# Patient Record
Sex: FEMALE | Race: WHITE | NOT HISPANIC OR LATINO | Employment: STUDENT | URBAN - METROPOLITAN AREA
[De-identification: names, ages, dates, MRNs, and addresses within clinical notes are randomized per-mention and may not be internally consistent; named-entity substitution may affect disease eponyms.]

---
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
## 2020-03-24 ENCOUNTER — HOSPITAL ENCOUNTER (OUTPATIENT)
Facility: CLINIC | Age: 20
Discharge: HOME | End: 2020-03-24
Attending: FAMILY MEDICINE
Payer: COMMERCIAL

## 2020-03-24 VITALS
DIASTOLIC BLOOD PRESSURE: 60 MMHG | SYSTOLIC BLOOD PRESSURE: 110 MMHG | WEIGHT: 103.6 LBS | TEMPERATURE: 98.3 F | OXYGEN SATURATION: 98 % | HEART RATE: 90 BPM

## 2020-03-24 DIAGNOSIS — K52.9 GASTROENTERITIS: Primary | ICD-10-CM

## 2020-03-24 LAB — PREGNANCY TEST URINE, POC: NEGATIVE

## 2020-03-24 PROCEDURE — S9088 SERVICES PROVIDED IN URGENT: HCPCS | Performed by: FAMILY MEDICINE

## 2020-03-24 PROCEDURE — 99202 OFFICE O/P NEW SF 15 MIN: CPT | Performed by: FAMILY MEDICINE

## 2020-03-24 PROCEDURE — 81025 URINE PREGNANCY TEST: CPT | Performed by: FAMILY MEDICINE

## 2020-03-24 RX ORDER — NORGESTIMATE AND ETHINYL ESTRADIOL 0.25-0.035
1 KIT ORAL
COMMUNITY
Start: 2020-03-15

## 2020-03-24 RX ORDER — LAMOTRIGINE 200 MG/1
200 TABLET ORAL
COMMUNITY
Start: 2020-03-01

## 2020-03-24 RX ORDER — ESCITALOPRAM OXALATE 20 MG/1
20 TABLET ORAL
COMMUNITY
Start: 2020-03-01

## 2020-03-24 ASSESSMENT — ENCOUNTER SYMPTOMS
SINUS PRESSURE: 0
SORE THROAT: 0
SINUS PAIN: 0
NEUROLOGICAL NEGATIVE: 1
NAUSEA: 1
CHILLS: 0
DIARRHEA: 1
PSYCHIATRIC NEGATIVE: 1
CARDIOVASCULAR NEGATIVE: 1
VOMITING: 1
FEVER: 0
ENDOCRINE NEGATIVE: 1
EYES NEGATIVE: 1
MUSCULOSKELETAL NEGATIVE: 1
RESPIRATORY NEGATIVE: 1

## 2020-03-24 NOTE — ED PROVIDER NOTES
History  Chief Complaint   Patient presents with   • Abdominal Pain   • Diarrhea   • Vomiting     She is here with the c/o diarrhea ,nausea and vomitings 4-5 times today ,diarrhea is watery ,but noticed some blood streaks on the wipe ,she thinks because of the frequent wiping ,no rectal bleeding   No sick contacts ,no recent travel  Has some abdominal cramping  No recent antibiotic use   No fever  No urine symptoms   She stated she is not pregnant ,got her LMP 1 week ago .on OC pills for contraception  Works in [employer] .    She ate food at [employer] yesterday           History reviewed. No pertinent past medical history.    History reviewed. No pertinent surgical history.    History reviewed. No pertinent family history.    Social History     Tobacco Use   • Smoking status: Never Smoker   • Smokeless tobacco: Never Used   Substance Use Topics   • Alcohol use: Not on file   • Drug use: Not on file       Review of Systems   Constitutional: Negative for chills and fever.   HENT: Negative for congestion, sinus pressure, sinus pain and sore throat.    Eyes: Negative.    Respiratory: Negative.    Cardiovascular: Negative.    Gastrointestinal: Positive for diarrhea, nausea and vomiting.   Endocrine: Negative.    Genitourinary: Negative.    Musculoskeletal: Negative.    Skin: Negative.    Neurological: Negative.    Psychiatric/Behavioral: Negative.        Physical Exam  ED Triage Vitals [03/24/20 1315]   Temp Heart Rate Resp BP SpO2   36.8 °C (98.3 °F) 90 -- 110/60 98 %      Temp src Heart Rate Source Patient Position BP Location FiO2 (%) (Set)   -- -- -- -- --       Physical Exam   Constitutional: No distress.   No signs of dehydration   HENT:   Right Ear: Tympanic membrane, external ear and ear canal normal.   Left Ear: Tympanic membrane, external ear and ear canal normal.   Nose: Nose normal.   Mouth/Throat: Oropharynx is clear and moist. No oropharyngeal exudate.   No sinus tenderness   Eyes: Pupils are equal, round, and  reactive to light. Conjunctivae are normal. Right eye exhibits no discharge. Left eye exhibits no discharge.   Neck: Normal range of motion.   Cardiovascular: Normal rate, regular rhythm and normal heart sounds.   Pulmonary/Chest: Effort normal and breath sounds normal. No respiratory distress. She has no wheezes. She has no rales.   Abdominal: Soft. Bowel sounds are normal. She exhibits no distension. There is no tenderness.   Lymphadenopathy:     She has no cervical adenopathy.         Procedures  Procedures    UC Course  Clinical Impressions as of Mar 24 1847   Gastroenteritis       MDM  Number of Diagnoses or Management Options  Gastroenteritis:   Diagnosis management comments:  Could be viral gastroenteritis  Advised symptomatic rx for now ,see f/u instructions  All the antiemetics like zofran /promethazine has interaction with her meds like lamictal /lexapro ,so will hold off for now as she is tolerating orally now ,she agreed with that  Urine pregnancy test is done which is negative   Advised to go to ER if abdominal cramping/pain persists ,needs further evaluation of the symptoms                  Pati Willis MD  03/24/20 1849       Pati Willis MD  03/24/20 1925       Pati Willis MD  03/24/20 1947

## 2020-03-24 NOTE — LETTER
March 24, 2020     Patient: Jessica Gómez  YOB: 2000  Date of Visit: 3/24/2020    To Whom it May Concern:    Jessica Gómez was seen in my clinic on 3/24/2020 at [place] .   Please excuse Jessica for her absence from work for 3 days until 03/26/2020 for medical reasons.    If you have any questions or concerns, please don't hesitate to call.         Sincerely,         No name on file.        CC: No Recipients

## 2020-03-24 NOTE — DISCHARGE INSTRUCTIONS
Fluids  BRAT diet  Go to ER if symptoms gets worse ,may need IV fluids   Follow up in 4-5 days  with your primary care doctor if still not improved ,may need stool studies at that time   Wash your hands frequently .   Tylenol as needed

## 2021-04-20 ENCOUNTER — IMMUNIZATION (OUTPATIENT)
Dept: IMMUNIZATION | Facility: CLINIC | Age: 21
End: 2021-04-20

## 2021-05-25 ENCOUNTER — IMMUNIZATION (OUTPATIENT)
Dept: IMMUNIZATION | Facility: CLINIC | Age: 21
End: 2021-05-25
Attending: FAMILY MEDICINE

## 2024-04-09 ENCOUNTER — HOSPITAL ENCOUNTER (OUTPATIENT)
Facility: CLINIC | Age: 24
Discharge: HOME | End: 2024-04-09
Attending: FAMILY MEDICINE
Payer: COMMERCIAL

## 2024-04-09 VITALS
HEART RATE: 80 BPM | WEIGHT: 105 LBS | BODY MASS INDEX: 17.93 KG/M2 | HEIGHT: 64 IN | OXYGEN SATURATION: 97 % | DIASTOLIC BLOOD PRESSURE: 76 MMHG | TEMPERATURE: 98.5 F | SYSTOLIC BLOOD PRESSURE: 116 MMHG

## 2024-04-09 DIAGNOSIS — R35.0 URINE FREQUENCY: ICD-10-CM

## 2024-04-09 DIAGNOSIS — R30.0 DYSURIA: Primary | ICD-10-CM

## 2024-04-09 DIAGNOSIS — R10.2 SUPRAPUBIC PAIN: ICD-10-CM

## 2024-04-09 LAB
BILIRUBIN, POC: NEGATIVE
BLOOD URINE, POC: POSITIVE
CLARITY, POC: CLEAR
COLOR, POC: YELLOW
EXPIRATION DATE: ABNORMAL
EXPIRATION DATE: NORMAL
GLUCOSE URINE, POC: NEGATIVE
KETONES, POC: NEGATIVE
LEUKOCYTE EST, POC: ABNORMAL
Lab: ABNORMAL
Lab: NORMAL
NITRITE, POC: NEGATIVE
PH, POC: 6
POCT MANUFACTURER: ABNORMAL
POCT MANUFACTURER: NORMAL
PREGNANCY TEST URINE, POC: NEGATIVE
PROTEIN, POC: ABNORMAL
SPECIFIC GRAVITY, POC: 1.02

## 2024-04-09 PROCEDURE — 81025 URINE PREGNANCY TEST: CPT | Performed by: FAMILY MEDICINE

## 2024-04-09 PROCEDURE — 99214 OFFICE O/P EST MOD 30 MIN: CPT | Performed by: FAMILY MEDICINE

## 2024-04-09 PROCEDURE — S9083 URGENT CARE CENTER GLOBAL: HCPCS | Performed by: FAMILY MEDICINE

## 2024-04-09 PROCEDURE — 81002 URINALYSIS NONAUTO W/O SCOPE: CPT | Performed by: FAMILY MEDICINE

## 2024-04-09 RX ORDER — CEFUROXIME AXETIL 250 MG/1
250 TABLET ORAL 2 TIMES DAILY
Qty: 14 TABLET | Refills: 0 | Status: SHIPPED | OUTPATIENT
Start: 2024-04-09 | End: 2024-04-16

## 2024-04-09 NOTE — DISCHARGE INSTRUCTIONS
Fluids  Take the antibiotic with food ,eat yogurt and take probiotics   Go to ER at [facility] if symptoms gets worse ,  Follow up in 1 week with your primary care doctor or gynecologist if still not improved ,needs further evaluation

## 2024-04-10 ASSESSMENT — ENCOUNTER SYMPTOMS
BACK PAIN: 0
DYSURIA: 1
FLANK PAIN: 0
VOMITING: 0
PSYCHIATRIC NEGATIVE: 1
CONSTIPATION: 0
DIARRHEA: 0
CONSTITUTIONAL NEGATIVE: 1
HEMATURIA: 0
EYES NEGATIVE: 1
NEUROLOGICAL NEGATIVE: 1
NECK PAIN: 0
NAUSEA: 0
FREQUENCY: 1
CARDIOVASCULAR NEGATIVE: 1
RESPIRATORY NEGATIVE: 1
ABDOMINAL PAIN: 1

## 2024-04-10 NOTE — ED PROVIDER NOTES
History  Chief Complaint   Patient presents with    UTI     Onset Sunday. Sx urgency, frequency, mild pain with urination.      She is here for possible UTI ,Onset Sunday. Sx urgency, frequency, mild pain with urination.has discomfort lower adomen.  No fever  No back pain  No blood in urine  LMP march 12   Use condoms ,no STD concerns        Past Medical History:   Diagnosis Date    Anxiety     Bipolar 1 disorder (CMS/[facility])        History reviewed. No pertinent surgical history.    History reviewed. No pertinent family history.    Social History     Tobacco Use    Smoking status: Never    Smokeless tobacco: Never       Review of Systems   Constitutional: Negative.    HENT: Negative.     Eyes: Negative.    Respiratory: Negative.     Cardiovascular: Negative.    Gastrointestinal:  Positive for abdominal pain. Negative for constipation, diarrhea, nausea and vomiting.   Genitourinary:  Positive for dysuria and frequency. Negative for flank pain, hematuria, vaginal bleeding and vaginal discharge.   Musculoskeletal:  Negative for back pain and neck pain.   Neurological: Negative.    Psychiatric/Behavioral: Negative.         Physical Exam  ED Triage Vitals [04/09/24 1706]   Temp Heart Rate Resp BP SpO2   36.9 °C (98.5 °F) 80 -- 116/76 97 %      Temp Source Heart Rate Source Patient Position BP Location FiO2 (%) (Set)   Tympanic -- Sitting Right upper arm --       Physical Exam  Cardiovascular:      Rate and Rhythm: Normal rate and regular rhythm.      Pulses: Normal pulses.      Heart sounds: Normal heart sounds.   Pulmonary:      Effort: Pulmonary effort is normal. No respiratory distress.      Breath sounds: Normal breath sounds. No wheezing, rhonchi or rales.   Abdominal:      General: Bowel sounds are normal. There is no distension.      Palpations: Abdomen is soft.      Tenderness: There is abdominal tenderness. There is no right CVA tenderness, left CVA tenderness or guarding.      Comments: Discomfort suprapubic area            Procedures  Procedures    UC Course       Medical Decision Making  Urine dip done ,see results in epic,c/w UTI   Sent for urine culture   Given cefuroxime  Fluids  Take the antibiotic with food ,eat yogurt and take probiotics   Go to ER at [facility] if symptoms gets worse ,  Follow up in 1 week with your primary care doctor or gynecologist if still not improved ,needs further evaluation                       Pati Willis MD  04/10/24 0902

## 2024-04-11 LAB
BACTERIA UR CULT: NO GROWTH
BACTERIA UR CULT: NORMAL

## 2024-05-24 ENCOUNTER — HOSPITAL ENCOUNTER (OUTPATIENT)
Facility: CLINIC | Age: 24
Discharge: HOME | End: 2024-05-24
Attending: FAMILY MEDICINE
Payer: COMMERCIAL

## 2024-05-24 VITALS
HEIGHT: 64 IN | HEART RATE: 72 BPM | BODY MASS INDEX: 17.93 KG/M2 | OXYGEN SATURATION: 96 % | RESPIRATION RATE: 14 BRPM | TEMPERATURE: 97.3 F | DIASTOLIC BLOOD PRESSURE: 76 MMHG | WEIGHT: 105 LBS | SYSTOLIC BLOOD PRESSURE: 109 MMHG

## 2024-05-24 DIAGNOSIS — J02.9 SORE THROAT: Primary | ICD-10-CM

## 2024-05-24 DIAGNOSIS — Z20.818 STREPTOCOCCUS EXPOSURE: ICD-10-CM

## 2024-05-24 LAB
EXPIRATION DATE: NORMAL
Lab: NORMAL
POCT MANUFACTURER: NORMAL
S PYO AG THROAT QL: NEGATIVE

## 2024-05-24 PROCEDURE — 99213 OFFICE O/P EST LOW 20 MIN: CPT | Performed by: FAMILY MEDICINE

## 2024-05-24 PROCEDURE — 87880 STREP A ASSAY W/OPTIC: CPT | Performed by: FAMILY MEDICINE

## 2024-05-24 PROCEDURE — 87081 CULTURE SCREEN ONLY: CPT | Performed by: FAMILY MEDICINE

## 2024-05-24 PROCEDURE — S9083 URGENT CARE CENTER GLOBAL: HCPCS | Performed by: FAMILY MEDICINE

## 2024-05-24 RX ORDER — LIDOCAINE HYDROCHLORIDE 20 MG/ML
5 SOLUTION OROPHARYNGEAL 4 TIMES DAILY PRN
Qty: 100 ML | Refills: 0 | Status: SHIPPED | OUTPATIENT
Start: 2024-05-24 | End: 2024-05-29

## 2024-05-24 ASSESSMENT — ENCOUNTER SYMPTOMS
RHINORRHEA: 0
COUGH: 0
CHILLS: 0
SORE THROAT: 1
VOICE CHANGE: 1
MYALGIAS: 1
FATIGUE: 0

## 2024-05-24 NOTE — DISCHARGE INSTRUCTIONS
Ibuprofen 400 mg by mouth every 6-8 hrs as needed.     Your Rapid strep was negative at the visit, a throat culture is sent to lab. If culture is positive, we will call in appropriate abx to your pharmacy. Please call us if you have not heard form us in 4 days.      Medications to be taken/used as recommended.  Please read all patient education handout regarding your diagnosis.  It is Strongly Recommended that you Follow up with your Primary care provider if your symptoms do not improve in 72 Hours or return to [facility].   ER warnings provided: go to ER if symptoms worsen at any time or cause worry.

## 2024-05-24 NOTE — ED PROVIDER NOTES
History  No chief complaint on file.    Patient is here for evaluation of pain sore throat.  Boyfriend testing positive earlier today.        Past Medical History:   Diagnosis Date    Anxiety     Bipolar 1 disorder (CMS/HCC)        No past surgical history on file.    No family history on file.    Social History     Tobacco Use    Smoking status: Never    Smokeless tobacco: Never       Review of Systems   Constitutional:  Negative for chills and fatigue.   HENT:  Positive for sore throat and voice change. Negative for congestion, postnasal drip and rhinorrhea.    Respiratory:  Negative for cough.    Musculoskeletal:  Positive for myalgias.       Physical Exam  ED Triage Vitals   Temp Pulse Resp BP SpO2   -- -- -- -- --      Temp src Heart Rate Source Patient Position BP Location FiO2 (%) (Set)   -- -- -- -- --       Physical Exam  Vitals and nursing note reviewed.   Constitutional:       General: She is awake. She is not in acute distress.     Appearance: Normal appearance. She is normal weight. She is not ill-appearing, toxic-appearing or diaphoretic.   HENT:      Head: Normocephalic and atraumatic.      Nose: No congestion or rhinorrhea.      Mouth/Throat:      Mouth: Mucous membranes are moist.      Pharynx: Oropharynx is clear. No oropharyngeal exudate or posterior oropharyngeal erythema.   Eyes:      General:         Right eye: No discharge.         Left eye: No discharge.      Extraocular Movements: Extraocular movements intact.      Conjunctiva/sclera: Conjunctivae normal.   Cardiovascular:      Rate and Rhythm: Normal rate.   Pulmonary:      Effort: Pulmonary effort is normal. No respiratory distress.   Musculoskeletal:      Cervical back: Neck supple. No tenderness.   Lymphadenopathy:      Cervical: No cervical adenopathy.   Neurological:      General: No focal deficit present.      Mental Status: She is alert and oriented to person, place, and time.      Cranial Nerves: No dysarthria or facial asymmetry.       Gait: Gait normal.   Psychiatric:         Mood and Affect: Mood normal.         Behavior: Behavior normal. Behavior is cooperative.           Procedures  Procedures    UC Course       MDM      Problem List Items Addressed This Visit    None  Visit Diagnoses       Sore throat    -  Primary    Relevant Orders    Strep A culture    Streptococcus exposure        Relevant Orders    Strep A culture                 Ibuprofen 400 mg by mouth every 6-8 hrs as needed.    Your Rapid strep was negative at the visit, a throat culture is sent to lab. If culture is positive, we will call in appropriate abx to your pharmacy. Please call us if you have not heard form us in 4 days.    Medications to be taken/used as recommended.  Please read all patient education handout regarding your diagnosis.  It is Strongly Recommended that you Follow up with your Primary care provider if your symptoms do not improve in 72 Hours or return to [facility].   ER warnings provided: go to ER if symptoms worsen at any time or cause worry.            Yaya Tanner MD  05/24/24 0739

## 2024-05-27 LAB — B-HEM STREP SPEC QL CULT: NORMAL
